# Patient Record
Sex: FEMALE | Race: WHITE
[De-identification: names, ages, dates, MRNs, and addresses within clinical notes are randomized per-mention and may not be internally consistent; named-entity substitution may affect disease eponyms.]

---

## 2018-04-24 ENCOUNTER — HOSPITAL ENCOUNTER (OUTPATIENT)
Dept: HOSPITAL 95 - LAB SHORT | Age: 83
End: 2018-04-24
Attending: FAMILY MEDICINE
Payer: MEDICARE

## 2018-04-24 DIAGNOSIS — E11.40: Primary | ICD-10-CM

## 2021-12-21 NOTE — NUR
ASSUMPTION OF CARE.
PT IS ON COMFORT CARE AND IS RESTING IN BED WITH NO S/S OF PAIN OR DISCOMFORT.
HER DAUGHTER IS AT THE BEDSIDE. PT REMAINS IN THE BIPAP PER THE FAMILY WISHES
AT THIS TIME. PT IS NOT ORIENTED AND HER EYES ARE CLOSED. SHE WAS REPOSTIONED.
HER ROTHMAN IS PATENT. HER DAUGHTER REPORTS THAT SHE WILL BE HERE FOR A WHILE
LONGER AND THEN HEAD HOME FOR THE NIGHT.

## 2021-12-21 NOTE — NUR
Bedside RN Ronal request Palliative care visit to pt's daughter. Answered
questions about end of life care. Pt's daughter tearful at this visit, and
understands she has compounding greif from the lost of her own child, and now
losing her mother. She does indicate she has a good support system. I held
space for her to gried and talk about her mom and daughter. She appeared more
relaxed after talking through it. Plan is to wait until pt's son arrives from
[place] tomorrow before removing the bipap. At this time, pt appears relaxed
and comfortable. Daughter understands that if pt desaturates, we will medicate
for any air hunger or s/s of discomfort as it occurs. She v/u and agrees with
this plan.

## 2021-12-22 NOTE — NUR
shift summary
 
pt rested well through the night. alert to self, but up and asking questions.
mentation improved through night. respiratory felt since vbg and mentation
improving, okay to remove bipap for now, and place pt on airvo. since the
transition has been made to airvo, sats maintain >94% 40L/35% fio2. no tele.
q2 turns. not in any pain. no secretions. oral care done. vss.
 
call light within reach, bed in lowest position. will continue to monitor.

## 2021-12-22 NOTE — NUR
Pt has been transferred to the medical floor, and her son and daugher are
currently at the bedside. Pt is resting, appears to be comfortable. She opens
her eyes intermittently, and is talking a little occasionally as well. No
immediate needs identified, other than possible placement, being handled by
[facility] care management.

## 2021-12-22 NOTE — NUR
PATIENT TRANSFERRED FROM PCU 9 TO ROOM 331, REPORT RECEIVED FROM NATHAN EDWARDS.
DAUGHTER MARA AT BEDSIDE. PATIENT DENIES ANY PAIN OR DISCOMFORT. ORIENTED
TO ROOM AND USE OF CALL LIGHT.

## 2021-12-22 NOTE — NUR
Per Jo Ann at [facility], patient and her family were working on
transitioning to [facility] who still has placement for the patient. I
faxed over a patient packet for the RN at [facility] to assess, who
also plans to come visit the patient in her room at 11:00am tomorrow to plan
for discharge to [facility]. After consulting with Dr. Soto, due to
the patient's progress made overnight, the patient potentially could discharge
to [facility] with hospice care. I plan to follow up with the daughter,
Michelle Dominguez [phone] and with [facility].

## 2021-12-22 NOTE — NUR
BED BATH COMPLETED THIS AM, PT TOLERATED BREAKFAST, REPOSITIONED IN BED FOR
COMFORT, DENIES ANY PAIN AT THIS TIME. FAMILY NOT PRESENT AT THIS TIME AND PT
IS TO TRANSFER TO MEDICAL FLOOR. WILL UPDATE FAMILY

## 2021-12-22 NOTE — NUR
SON HUSEYIN AT BEDSIDE. PATIENT AWAKE AND TALKING, DENIES ANY PAIN OR DISCOMFORT.
ROTHMAN TO GRAVITY. PATIENT AND FAMILY DENY ANY NEEDS AT THIS TIME.

## 2021-12-23 NOTE — NUR
PATIENT NO LONGER ON COMFORT CARE, REMAINS A DNR. 2LO2 TO MAINTAIN SATS. VSS.
DENIES ANY PAIN OR DISCOMFORT. GENREALIZED NONPITTING EDEMA. STARTED ON
ELIQUIS TO TREAT PE'S. WORKED WITH PT/OT TODAY, ABLE TO STAND AT BEDSIDE WITH
2 ASSIST. COCCYX REDENED, NO OPEN AREAS. COOPERATIVE WITH CARE, ABLE TO MAKE
NEEDS KNOWN. CHANGED TO A PUREE DIET AT PATIENT REQUEST, PATIENT FELT MS DIET
WAS TOO DIFFICULT TO SWALLOW.

## 2021-12-23 NOTE — NUR
PATIENT WAS ALERT AND ORIENTED X3, STABLE VITAL SIGNS, NO ACUTE CHANGES.
PATIENT DENIES ANY PAIN. PATIENT CURRNETLY ON COMFORT CARE. CALL LIGHT WITHIN
REACH AND BED DOWN TO THE LOWEST POSITION.

## 2021-12-24 NOTE — NUR
PT HAS BEEN UNRESPONSIVE ON BIPAP T/O THE NIGHT. SPOKE TO  AND HE IS AWARE.
PT DAUGHTER ARRIVED AND IS DECISION MAKING FOR THE PT. THE PLAN WAS TO RETURN
THE PT TO COMFORT CARE HOWEVER DURRING THE CONVERSATION THE PT WOKE, FULLY. PT
SITTING UP IN BED TALKING WITH HER DAUGHTER, DOCTOR DC'D THE BIPAP AT THIS
TIME THE PLAN IS COMFORT CARE.

## 2021-12-24 NOTE — NUR
SPOKE TO DR RADHA FERRERA PT CHANGE IN STATUS T/O THE NIGHT. PLAN WAS TO DC
TODAY TO [facility], HOWEVER PT HAS, AGAIN BECOME UNRESPONSIVE, AABG WAS
DONE LAST NIGHT AND PT WAS PLACED ON BIPAP. COMFORT CARE ORDERS STILL ACTIVE
DR WILL SPEAK TO FAMILY TO DETERMINE THEIR WISHES. COMFORT CARE ORDERS WILL BE
CLARIFIED AT THAT TIME.

## 2021-12-24 NOTE — NUR
SHIFT SUMMARY-
PT ALERT AND ORIENTED X2 TODAY T/O THE DAY. SHE ATE ALL THREE MEALS AND HAD 2
BOWEL MOVEMENTS. PT HAS DENIED PAIN WHEN ASKED, REPOSITION Q2 AS PT
TOLLERATES. PLAN IS FOR THE PT TO DC ON HOSPICE TO ASS[facility]ED [facility]IVING POSSIBLY
MONDAY (IF IT CAN BE ARRANGED). PT IS NO LONGER ON COMFORT CARE PER DR MCGHEE,
PT IS TO BE PLACED ON BIPAP TONIGHT, DR AWARE OF HER UNRESPONSIVE EPISODE LAST
NIGHT. THE PT HAS DECLINED BIPAP WHILE SHE NAPS DURING THE DAY, HOWEVER HAS
AGREED TO WEAR IT AT NIGHT. PT CURRENTLY IN BED WITH HER CALL LIGHT IN REACH,
SHE USED IT APPROPRIATELY TODAY. PT WAS CONTINENT OF BOWEL TODAY AND WAS ABLE
TO USE THE BEDPAN. ROTHMAN IS IN PLACE PATENT AND DRAINING CLEAR YELLOW URINE.

## 2021-12-24 NOTE — NUR
PATIENT WAS ALERT AND ORIENTED X3, STABLE VITAL SIGNS, NO ACUTE CHANGES.
PATIENT DENIES ANY PAIN. POSSIBLE DISCHARGE TO SKILLED FACILITY. CALL LIGHT
WITHIN  REACH AND BED DOWN TO THE LOWEST POSITION.

## 2021-12-24 NOTE — NUR
PT AND DAUGHTER HAVE TALKED AND THE PT DAUGHTER REQUESTED THE DOCTOR TO RETURN
TO THE ROOM TO EXPLAIN THE PLAN TO THE PT NOW THAT SHE IS FULLY AWAKE. PT
CONVEYED THAT IF SHE CAN BE PLACED ON THE BIPAP MACHINE AT [facility] AND BE ABLE
TO WAKE IN THE MORNING AND VISIT WITH HER FAMILY LIKE SHE IS TODAY SHE WOULD
LIKE TO DO THAT. THE PT DAUGHTER REQUESTED THAT THE DOCTOR D/C THE COMFORT
CARE ORDERS AT THIS TIME. RT UPDATED AS TO THE PLAN. PLAN IS FOR THE PT TO DC
ON HOSPICE TO AN ASSISTED LIVING FACILITY WITH NIGHT TIME BIPAP.  WILL DC
THE COMFORT CARE ORDERS A LITTLE LATER.

## 2021-12-25 NOTE — NUR
SHIFT SUMMARY
PT REMAINS ALERT AND ORIENTED THIS EVENING. PT ON 3L NC WITH O2 SATS REMAINING
ABOVE 90%. BP STABLE. PT DENIES ANY PAIN. DAUGHTER AT BEDSIDE. PER DAUGHTER
AND PT, IF SHE BECOMES LETHARGIC OR UNRESPONSIVE AGAIN THEY DO NOT WANT ANY
ESCALATION OF CARE. PT REPOSITIONED Q2H. ROTHMAN PATENT AND DRAINING. WILL
CONTINUE TO MONITOR AND REPORT TO ONCOMING RN.

## 2021-12-25 NOTE — NUR
SHIFT SUMMARY
PT IS AN 87 Y/O FEMALE, ADMITTED FOR ACUTE RESPIRATORY FAILURE. SHE IS A&O X
3, BEDREST. PT REFUSED HER BIPAP LAST NIGHT, AND IS CURRENTLY ON 5L O2 VIA NC.
VITAL SIGNS STABLE. NO C/O ACUTE PAIN, NAUSEA OR SOB. ROTHMAN IN PLACE, PATENT
AND DRAINING. CALL LIGHT IN REACH. NO OTHER ACUTE CHANGES IN PT CONDITION
NOTED DURING THE NIGHT. WILL CONTINUE TO MONITOR AND TREAT PER EMAR UNTIL HAND
OFF TO DAY SHIFT RN.

## 2021-12-25 NOTE — NUR
UPDATE
PT STARTS PULLING AT BIPAP MASK. PT IS NOW AWAKE AND ALERT. BIPAP REMOVED AND
PT PLACED ON 5L NC. PT ANSWERING QUESTIONS APPROPRIATELY. ORAL CARE PROVIDED.
WILL CONTINUE TO MONITOR

## 2021-12-25 NOTE — NUR
UPDATE
ASSUMED CARE AT APPROXIMATELY 0730. PT ONLY RESPONSIVE TO STERNAL RUN WITH
SLIGHT [place]ACE. PT NOT FOLLOWING DIRECTIONS. PT  WILL NOT OPEN HER EYES. O2
SATS >90% ON 5L NC. BP STABLE. DR. MCGHEE CALLED AND NOITIFED OF DECREASED
LOC. PT PLACED ON VISION BIPAP PER RT. PER DR. MCGHEE PT AND FAMILY DO NOT
WANT ESCALATION OF CARE. WILL CONTINUE TO MONITOR CLOSELY

## 2021-12-26 NOTE — NUR
NIGHT SHIFT SUMMARY
PATIENT HAD A FAIR SHIFT. VITALS CHECKED AND RECORDED AND WERE STABLE. SHE HAD
BIPAP ON ALL NIGHT. NO COMPLAINT WAS LODGED. WILL CONTINUE TO MONITOR HER.

## 2021-12-26 NOTE — NUR
AAOX2 ABLE TO MAKE NEEDS KNOWN, COMPLIANT WITH MEDICATION REGIMEN. CONTINUES
ON O2 AT 3L VIA NC. NO S/S OF DISTRESS NOTED. BED IN LOWEST POSITION. CALL
LIGHT IN REACH, PT USED APPROPRIATELY.

## 2021-12-27 NOTE — NUR
SHIFT SUMMARY: PT A/O TO SELF, SITUATION. 2 PERSON MAX ASSIST TO CHAIR TODAY
WITH GAIT BELT. PT VERY WEAK AND HAD DIFFICULTY LIFTING FEET, NEEDED CUEING.
PT TOLERATED SITTING IN CHAIR FOR APPROX 30 MINUTES FOR LUNCH. PT EVALUATED BY
CÉSAR TODAY. PT CONTINUES TO BE ON 2 LPM VIA NC.

## 2021-12-27 NOTE — NUR
NIGHT SHIFT SUMMARY
PATIENT HAD A FAIR SHIFT, HE RVITALS REMAINED STABLE. SHE HAD HER BIPAP ON
OVER NIGHT. SATURATION WERE STABLE, WILL CONTINUE TO MONITOR HER.

## 2021-12-28 NOTE — NUR
NIGHT SHIFT SUMMARY
PATIENT HAD A FAIR SHIFT. REFUSED  BIPAP THIS NIGHT, BUT CONTINUED TO BE ON
OXYGEN AT 2-3L AND SHE IS SATURATING AT 95%, WILL CONTINUE TO MONITOR HER.

## 2021-12-28 NOTE — NUR
Pt resting in bed upon arrival. Pt denies pain and dyspnea at this time. Pt
appears mildly confused and struggles with remembering titles of family ie
daughter, daughter in law, son, grandson. Offered therapeutic listening as Pt
reports family is still working on a long term plan for her. Continued
therapeutic listening.
 
Spoke with Primary RN Luciana and discussed case.
 
Spoke with Carina D/C Planner Carina and discussed case.
 
Called and spoke with Pt's daughter Michelle. Engaged in therapeutic
discussion regarding goals of care. Offered therapeutic listening and answered
questions. Michelle reports goal is to find suitable long term placement with
hospice services. She reports struggles with finding a facility that won't
just keep Pt in a bed. Michelle is searching for a facility that will assist
with getting Pt up out of bed on good days to assist with quality of life.
Continued therapeutic listening.
 
Palliative Care will remain available.

## 2021-12-28 NOTE — NUR
SUMMARY
 
PT RESTING QUIETLY IN BED WATCHING TV, PT HAS BEEN COOPERATIVE WITH CARE T/O
THE DAY, OCC CONFUSION, UP WITH THERAPY TODAY, ABLE TO STAND AND SIT ON THE
COMMODE WITH 1-2 P ASSIST, FAMILY HAS BEEN IN TO VISIT, PER [facility]
MANAGER THE PT MAY BE GOING BACK TO [facility] TOMORROW, VSS, NO
COMPLAINTS, WILL CONT TO MONITOR

## 2021-12-29 NOTE — NUR
Spoke with the family, Avril with [facility], [facility], and [facility]
yesterday. Per NATHAN Bartholomew with [facility] - patient would be appropriate
to transfer to [facility] with Hospice care but, due to her progress in
health, patient's family would like her to return to [facility] for further physical
therapy as she is currently a two person assist and [facility] only provides
one person assist. Per [facility], patient would be appropriate
for hospice if the family decides and the patient's health rapidly declines
again. I met with Price Martinez and Cynthia (Patient's family) yesterday and
they would like her to try to go back to [facility] to gain some strength. They
family feels that if the patient transfers and remains on hospice, her health
and quality of life will decline. Patient was able to sit up in her chair and
eat lunch Monday. I am currently waiting to hear from [facility] to confirm
placement.

## 2021-12-29 NOTE — NUR
CALL TO DR. ZEE
CALL MADE TO DR. ZEE ABOUT PT FOLDS BEING YEASTY. LOOSE, JELLY LIKE STOOL
ALSO REPORTED. ORDERS FOR ANTIFUNGAL AND TO TAKE A C DIFF SAMPLE OBTAINED. DR. ZEE ALSO ORDERED THAT IT WAS OKAY TO LEAVE OUT IV.

## 2021-12-29 NOTE — NUR
DISCHARGE
PT DISCHARGED. REPORT CALLED TO . PT PICKED UP BY [facility].
BELONGINGS SENT WITH PT. DR. ZEE NOTIFIED OF POOR FLUID INTAKE AND OUTPUT
PRIOR TO DC. NO OTHER ACUTE CHANGES IN ASSESSMENT PRIOR TO DC. PT NEGATIVE
FOR COVID AND C DIFF.

## 2021-12-30 NOTE — NUR
Per chart review with Dr. Hall, patient was appropriate for discharge
yesterday. Avril with [facility] stated they would accept her back and
she was confirmed for SNF placement at [facility]. I called [facility],
scheduled a wheelchair transport with O2 at 3PM. I confirmed eta with Avril,
Dr. Hall, patient's daughter Michelle Dominguez, and the patient's nurse. Per
patient's nurse, there was a concern for possible C. Diff - stool sample was
collected and sent to lab. Patient was negative for C. Diff. Patient was
tested for covid - results negative. I included these test results, discharge
summary, and discharge med rec in an email sent to Avril at [facility]
as well as place with her patient packet in a folder and placed in her patient
lock box room 331 for discharge transport. Patient is aware she is going back
to [facility] to continue physical therapy.